# Patient Record
Sex: MALE | Race: WHITE | NOT HISPANIC OR LATINO | Employment: UNEMPLOYED | URBAN - METROPOLITAN AREA
[De-identification: names, ages, dates, MRNs, and addresses within clinical notes are randomized per-mention and may not be internally consistent; named-entity substitution may affect disease eponyms.]

---

## 2017-01-04 DIAGNOSIS — S06.9X9S: ICD-10-CM

## 2017-01-04 DIAGNOSIS — R53.83 LETHARGY: Primary | ICD-10-CM

## 2017-01-04 DIAGNOSIS — R29.6 FALLS FREQUENTLY: ICD-10-CM

## 2017-01-04 DIAGNOSIS — R47.89 SLOW RATE OF SPEECH: ICD-10-CM

## 2017-01-04 DIAGNOSIS — K11.7 DROOLING: ICD-10-CM

## 2017-01-10 ENCOUNTER — HOSPITAL ENCOUNTER (OUTPATIENT)
Dept: MRI IMAGING | Facility: HOSPITAL | Age: 42
Discharge: HOME | End: 2017-01-10
Admitting: NURSE PRACTITIONER

## 2017-01-10 DIAGNOSIS — R29.6 FALLS FREQUENTLY: ICD-10-CM

## 2017-01-10 DIAGNOSIS — K11.7 DROOLING: ICD-10-CM

## 2017-01-10 DIAGNOSIS — S06.9X9S: ICD-10-CM

## 2017-01-10 DIAGNOSIS — R53.83 LETHARGY: ICD-10-CM

## 2017-01-10 DIAGNOSIS — B35.2 TINEA MANUUM: Primary | ICD-10-CM

## 2017-01-10 DIAGNOSIS — R47.89 SLOW RATE OF SPEECH: ICD-10-CM

## 2017-01-10 PROCEDURE — 70551 MRI BRAIN STEM W/O DYE: CPT

## 2017-01-12 ENCOUNTER — OFFICE VISIT (OUTPATIENT)
Dept: FAMILY MEDICINE CLINIC | Facility: CLINIC | Age: 42
End: 2017-01-12

## 2017-01-12 VITALS
HEIGHT: 70 IN | HEART RATE: 84 BPM | TEMPERATURE: 98.3 F | DIASTOLIC BLOOD PRESSURE: 90 MMHG | SYSTOLIC BLOOD PRESSURE: 120 MMHG

## 2017-01-12 DIAGNOSIS — R47.89 SLOW RATE OF SPEECH: ICD-10-CM

## 2017-01-12 DIAGNOSIS — S06.9X9S: ICD-10-CM

## 2017-01-12 DIAGNOSIS — K11.7 DROOLING: ICD-10-CM

## 2017-01-12 DIAGNOSIS — R53.83 LETHARGY: ICD-10-CM

## 2017-01-12 DIAGNOSIS — R26.89 IMBALANCE: Primary | ICD-10-CM

## 2017-01-12 PROCEDURE — 99213 OFFICE O/P EST LOW 20 MIN: CPT | Performed by: FAMILY MEDICINE

## 2017-01-12 NOTE — MR AVS SNAPSHOT
Reji Chen   1/12/2017 9:15 AM   Office Visit    Dept Phone:  [phone]   Encounter #:  [id number]    Provider:  Winter Robertson DO   Department:  [facility]                Your Full Care Plan              Your Updated Medication List          This list is accurate as of: 1/12/17  9:43 AM.  Always use your most recent med list.                ammonium lactate 12 % lotion   Commonly known as:  LAC-HYDRIN       atorvastatin 40 MG tablet   Commonly known as:  LIPITOR       baclofen 20 MG tablet   Commonly known as:  LIORESAL       FLUoxetine 20 MG capsule   Commonly known as:  PROzac       Ibuprofen 200 MG capsule       ketoconazole 2 % cream   Commonly known as:  NIZORAL   Apply to affected area between fingers once daily for 4 weeks       LOVAZA 1 G capsule   Generic drug:  omega-3 acid ethyl esters       OLANZapine 15 MG tablet   Commonly known as:  zyPREXA       PROAIR  (90 BASE) MCG/ACT inhaler   Generic drug:  albuterol       tiZANidine 4 MG tablet   Commonly known as:  ZANAFLEX       Vitamin D 2000 UNITS capsule               We Performed the Following     Ambulatory Referral to Neurology       You Were Diagnosed With        Codes Comments    Drooling    -  Primary ICD-10-CM: K11.7  ICD-9-CM: 527.7     Lethargy     ICD-10-CM: R53.83  ICD-9-CM: 780.79     Slow rate of speech     ICD-10-CM: R47.89  ICD-9-CM: 784.59     Traumatic brain injury with loss of consciousness, unspecified duration, sequela     ICD-10-CM: S06.9X9S  ICD-9-CM: 907.0     Imbalance     ICD-10-CM: R26.89  ICD-9-CM: 781.2       Instructions    Go to the nearest ER or return to clinic if symptoms worsen, fever/chill develop      Fall Prevention in the Home   Falls can cause injuries. They can happen to people of all ages. There are many things you can do to make your home safe and to help prevent falls.   WHAT CAN I DO ON THE OUTSIDE OF MY HOME?  · Regularly fix the  edges of walkways and driveways and fix any cracks.  · Remove anything that might make you trip as you walk through a door, such as a raised step or threshold.  · Trim any bushes or trees on the path to your home.  · Use bright outdoor lighting.  · Clear any walking paths of anything that might make someone trip, such as rocks or tools.  · Regularly check to see if handrails are loose or broken. Make sure that both sides of any steps have handrails.  · Any raised decks and porches should have guardrails on the edges.  · Have any leaves, snow, or ice cleared regularly.  · Use sand or salt on walking paths during winter.  · Clean up any spills in your garage right away. This includes oil or grease spills.  WHAT CAN I DO IN THE BATHROOM?   · Use night lights.  · Install grab bars by the toilet and in the tub and shower. Do not use towel bars as grab bars.  · Use non-skid mats or decals in the tub or shower.  · If you need to sit down in the shower, use a plastic, non-slip stool.  · Keep the floor dry. Clean up any water that spills on the floor as soon as it happens.  · Remove soap buildup in the tub or shower regularly.  · Attach bath mats securely with double-sided non-slip rug tape.  · Do not have throw rugs and other things on the floor that can make you trip.  WHAT CAN I DO IN THE BEDROOM?  · Use night lights.  · Make sure that you have a light by your bed that is easy to reach.  · Do not use any sheets or blankets that are too big for your bed. They should not hang down onto the floor.  · Have a firm chair that has side arms. You can use this for support while you get dressed.  · Do not have throw rugs and other things on the floor that can make you trip.  WHAT CAN I DO IN THE KITCHEN?  · Clean up any spills right away.  · Avoid walking on wet floors.  · Keep items that you use a lot in easy-to-reach places.  · If you need to reach something above you, use a strong step stool that has a grab bar.  · Keep  electrical cords out of the way.  · Do not use floor polish or wax that makes floors slippery. If you must use wax, use non-skid floor wax.  · Do not have throw rugs and other things on the floor that can make you trip.  WHAT CAN I DO WITH MY STAIRS?  · Do not leave any items on the stairs.  · Make sure that there are handrails on both sides of the stairs and use them. Fix handrails that are broken or loose. Make sure that handrails are as long as the stairways.  · Check any carpeting to make sure that it is firmly attached to the stairs. Fix any carpet that is loose or worn.  · Avoid having throw rugs at the top or bottom of the stairs. If you do have throw rugs, attach them to the floor with carpet tape.  · Make sure that you have a light switch at the top of the stairs and the bottom of the stairs. If you do not have them, ask someone to add them for you.  WHAT ELSE CAN I DO TO HELP PREVENT FALLS?  · Wear shoes that:    Do not have high heels.    Have rubber bottoms.    Are comfortable and fit you well.    Are closed at the toe. Do not wear sandals.  · If you use a stepladder:    Make sure that it is fully opened. Do not climb a closed stepladder.    Make sure that both sides of the stepladder are locked into place.    Ask someone to hold it for you, if possible.  · Clearly michelle and make sure that you can see:    Any grab bars or handrails.    First and last steps.    Where the edge of each step is.  · Use tools that help you move around (mobility aids) if they are needed. These include:    Canes.    Walkers.    Scooters.    Crutches.  · Turn on the lights when you go into a dark area. Replace any light bulbs as soon as they burn out.  · Set up your furniture so you have a clear path. Avoid moving your furniture around.  · If any of your floors are uneven, fix them.  · If there are any pets around you, be aware of where they are.  · Review your medicines with your doctor. Some medicines can make you feel dizzy.  This can increase your chance of falling.  Ask your doctor what other things that you can do to help prevent falls.     This information is not intended to replace advice given to you by your health care provider. Make sure you discuss any questions you have with your health care provider.     Document Released: 10/14/2010 Document Revised: 2016 Document Reviewed: 2016  [employer] Interactive Patient Education © [employer]       Patient Instructions History      Upcoming Appointments     Visit Type Date Time Department    OFFICE VISIT 2017  9:15 AM [facility]    FOLLOW UP 3/9/2017  8:45 AM [facility]      [employer] Signup     [facility] wildcraft allows you to send messages to your doctor, view your test results, renew your prescriptions, schedule appointments, and more. To sign up, go to [employer] and click on the Sign Up Now link in the New User? box. Enter your wildcraft Activation Code exactly as it appears below along with the last four digits of your Social Security Number and your Date of Birth () to complete the sign-up process. If you do not sign up before the expiration date, you must request a new code.    wildcraft Activation Code: [email or web address]  Expires: 2017  9:40 AM    If you have questions, you can email [email or web address] or call [phone] to talk to our wildcraft staff. Remember, wildcraft is NOT to be used for urgent needs. For medical emergencies, dial 911.               Other Info from Your Visit           Your Appointments     Mar 09, 2017  8:45 AM EST   Follow Up with Winter Robertson DO   [facility] (--)    [address] [place]  [place] [address] [phone]           Arrive 15 minutes prior to appointment. Arrive 15 minutes before your appointment to complete Registration. Please bring all medications, insurance card and copay.              Allergies     Dantrolene       "  Reason for Visit     FU on frequent falls           Vital Signs     Blood Pressure Pulse Temperature Height Smoking Status       120/90 84 98.3 °F (36.8 °C) 70\" (177.8 cm) Current Every Day Smoker       Problems and Diagnoses Noted     Drooling    -  Primary    Lethargy        Slow rate of speech        Brain injury        Imbalance            "

## 2017-01-12 NOTE — PATIENT INSTRUCTIONS
Go to the nearest ER or return to clinic if symptoms worsen, fever/chill develop      Fall Prevention in the Home   Falls can cause injuries. They can happen to people of all ages. There are many things you can do to make your home safe and to help prevent falls.   WHAT CAN I DO ON THE OUTSIDE OF MY HOME?  · Regularly fix the edges of walkways and driveways and fix any cracks.  · Remove anything that might make you trip as you walk through a door, such as a raised step or threshold.  · Trim any bushes or trees on the path to your home.  · Use bright outdoor lighting.  · Clear any walking paths of anything that might make someone trip, such as rocks or tools.  · Regularly check to see if handrails are loose or broken. Make sure that both sides of any steps have handrails.  · Any raised decks and porches should have guardrails on the edges.  · Have any leaves, snow, or ice cleared regularly.  · Use sand or salt on walking paths during winter.  · Clean up any spills in your garage right away. This includes oil or grease spills.  WHAT CAN I DO IN THE BATHROOM?   · Use night lights.  · Install grab bars by the toilet and in the tub and shower. Do not use towel bars as grab bars.  · Use non-skid mats or decals in the tub or shower.  · If you need to sit down in the shower, use a plastic, non-slip stool.  · Keep the floor dry. Clean up any water that spills on the floor as soon as it happens.  · Remove soap buildup in the tub or shower regularly.  · Attach bath mats securely with double-sided non-slip rug tape.  · Do not have throw rugs and other things on the floor that can make you trip.  WHAT CAN I DO IN THE BEDROOM?  · Use night lights.  · Make sure that you have a light by your bed that is easy to reach.  · Do not use any sheets or blankets that are too big for your bed. They should not hang down onto the floor.  · Have a firm chair that has side arms. You can use this for support while you get dressed.  · Do not have  throw rugs and other things on the floor that can make you trip.  WHAT CAN I DO IN THE KITCHEN?  · Clean up any spills right away.  · Avoid walking on wet floors.  · Keep items that you use a lot in easy-to-reach places.  · If you need to reach something above you, use a strong step stool that has a grab bar.  · Keep electrical cords out of the way.  · Do not use floor polish or wax that makes floors slippery. If you must use wax, use non-skid floor wax.  · Do not have throw rugs and other things on the floor that can make you trip.  WHAT CAN I DO WITH MY STAIRS?  · Do not leave any items on the stairs.  · Make sure that there are handrails on both sides of the stairs and use them. Fix handrails that are broken or loose. Make sure that handrails are as long as the stairways.  · Check any carpeting to make sure that it is firmly attached to the stairs. Fix any carpet that is loose or worn.  · Avoid having throw rugs at the top or bottom of the stairs. If you do have throw rugs, attach them to the floor with carpet tape.  · Make sure that you have a light switch at the top of the stairs and the bottom of the stairs. If you do not have them, ask someone to add them for you.  WHAT ELSE CAN I DO TO HELP PREVENT FALLS?  · Wear shoes that:    Do not have high heels.    Have rubber bottoms.    Are comfortable and fit you well.    Are closed at the toe. Do not wear sandals.  · If you use a stepladder:    Make sure that it is fully opened. Do not climb a closed stepladder.    Make sure that both sides of the stepladder are locked into place.    Ask someone to hold it for you, if possible.  · Clearly michelle and make sure that you can see:    Any grab bars or handrails.    First and last steps.    Where the edge of each step is.  · Use tools that help you move around (mobility aids) if they are needed. These include:    Canes.    Walkers.    Scooters.    Crutches.  · Turn on the lights when you go into a dark area. Replace any  light bulbs as soon as they burn out.  · Set up your furniture so you have a clear path. Avoid moving your furniture around.  · If any of your floors are uneven, fix them.  · If there are any pets around you, be aware of where they are.  · Review your medicines with your doctor. Some medicines can make you feel dizzy. This can increase your chance of falling.  Ask your doctor what other things that you can do to help prevent falls.     This information is not intended to replace advice given to you by your health care provider. Make sure you discuss any questions you have with your health care provider.     Document Released: 10/14/2010 Document Revised: 05/03/2016 Document Reviewed: 01/22/2016  Elsevier Interactive Patient Education ©2016 [employer]

## 2017-01-12 NOTE — PROGRESS NOTES
[facility] DANNY Chen is a 41 y.o. male.     History of Present Illness   Here for follow up of frequent falls, lethargy, drooling  Recently had MRI of brain completed, no acute findings, only posttraumatic changes and chronic sinusitis (he is asymptomatic)  Barium swallow study is scheduled for next week.  Labs were completed Dec 2016, nothing remarkable to explain condition. CXR completed then, atelectatic changes seen. No respiratory symptoms.   Teresa Tam is accompanying him today. Patient is a [facility] client. She states that his condition hasn't changed any. She thinks that he is still talking slow, still lethargic and drooling.   They currently have him under fall precautions, they have had to catch him multiple times to prevent falls. He hasn't had an actual fall since his last visit with me.   The patient states that he feels fine and doesn't feel tired. He is capable of answering my questions, not altered  Pt is seeing Dr. Timo Kim PM&R, recently decreased Baclofen from 20mg TID to 10mg TID to see if that would improve his condition. They have not noticed any improvement yet.   He currently isn't established with neurology.     The following portions of the patient's history were reviewed and updated as appropriate: allergies, current medications, past family history, past medical history, past social history, past surgical history and problem list.    Review of Systems   Constitutional: Positive for fatigue. Negative for chills and fever.   HENT:        Drooling   Respiratory: Negative for cough and shortness of breath.    Cardiovascular: Negative for chest pain.   Gastrointestinal: Negative.    Musculoskeletal: Positive for gait problem (ambulates with walker, imbalance).   Skin: Negative for wound.   Neurological: Positive for speech difficulty (secondary to TBI). Negative for dizziness, syncope, light-headedness and headaches.   Psychiatric/Behavioral: Negative for agitation.        Objective   Physical Exam   Constitutional: He is oriented to person, place, and time. He appears well-developed and well-nourished.   HENT:   Head: Normocephalic and atraumatic.   Right Ear: External ear normal.   Left Ear: External ear normal.   Nose: Nose normal.   Eyes: Conjunctivae and EOM are normal.   Neck: Normal range of motion. Neck supple.   Cardiovascular: Normal rate, regular rhythm and normal heart sounds.    No murmur heard.  Pulmonary/Chest: Effort normal and breath sounds normal. No respiratory distress. He has no wheezes.   Musculoskeletal: He exhibits no edema.   Neurological: He is alert and oriented to person, place, and time. No cranial nerve deficit.   Skin: Skin is warm and dry. No rash noted.   Psychiatric: He has a normal mood and affect. His behavior is normal. Judgment and thought content normal.   Slow speech   Nursing note and vitals reviewed.      Assessment/Plan   Mendoza was seen today for fu on frequent falls.    Diagnoses and all orders for this visit:    Imbalance    Lethargy  -     Ambulatory Referral to Neurology    Drooling  -     Ambulatory Referral to Neurology    Slow rate of speech  -     Ambulatory Referral to Neurology    Traumatic brain injury with loss of consciousness, unspecified duration, sequela  -     Ambulatory Referral to Neurology    Due to ongoing symptoms without improvement, will refer to neurology for further evaluation  Continue fall precautions  Complete the barium swallow study due to drooling  Go to the nearest ER or return to clinic if symptoms worsen, fever/chill develop    Winter Robertson,

## 2017-02-01 ENCOUNTER — TELEPHONE (OUTPATIENT)
Dept: FAMILY MEDICINE CLINIC | Facility: CLINIC | Age: 42
End: 2017-02-01

## 2017-02-28 ENCOUNTER — TELEPHONE (OUTPATIENT)
Dept: NEUROLOGY | Facility: CLINIC | Age: 42
End: 2017-02-28

## 2017-02-28 NOTE — TELEPHONE ENCOUNTER
----- Message from Chapis Pack MD sent at 2/28/2017  9:25 AM EST -----  Regarding: RE: FALLON- REFERRAL  Yes, sorry to delay on these. This referral is OK.  ----- Message -----     From: Kelley Brito     Sent: 2/28/2017   8:21 AM       To: Chapis Pack MD  Subject: FALLON- REFERRAL                              I SENT YOU A REFERRAL FOR THIS PT TO LOOK OVER. THE PATIENT'S MOTHER WANTS THE PATIENT TO SEE YOU AND ONLY YOU BASICALLY. CAN YOU PLEASE LOOK OVER THAT REFERRAL AND LET ME KNOW WHAT YOU THINK? I HAVE EXPLAINED TO THE NURSE AT THE HOME HE LIVES IN HOW YOUR REFERRAL PROCESS GOES.     ALL OF HIS INFO SHOULD BE IN YOUR INBASKET    THANK YOU SO MUCH,  KELLEY

## 2017-05-17 ENCOUNTER — OFFICE VISIT (OUTPATIENT)
Dept: NEUROLOGY | Facility: CLINIC | Age: 42
End: 2017-05-17

## 2017-05-17 ENCOUNTER — LAB (OUTPATIENT)
Dept: LAB | Facility: HOSPITAL | Age: 42
End: 2017-05-17

## 2017-05-17 VITALS
HEART RATE: 76 BPM | SYSTOLIC BLOOD PRESSURE: 118 MMHG | DIASTOLIC BLOOD PRESSURE: 84 MMHG | WEIGHT: 198 LBS | BODY MASS INDEX: 28.41 KG/M2

## 2017-05-17 DIAGNOSIS — F05: Primary | ICD-10-CM

## 2017-05-17 DIAGNOSIS — F05: ICD-10-CM

## 2017-05-17 LAB
AMMONIA BLD-SCNC: 18 UMOL/L (ref 19–60)
FOLATE SERPL-MCNC: 9.76 NG/ML (ref 3.2–20)
VIT B12 BLD-MCNC: 285 PG/ML (ref 211–911)

## 2017-05-17 PROCEDURE — 99205 OFFICE O/P NEW HI 60 MIN: CPT | Performed by: PSYCHIATRY & NEUROLOGY

## 2017-05-17 PROCEDURE — 82140 ASSAY OF AMMONIA: CPT | Performed by: PSYCHIATRY & NEUROLOGY

## 2017-05-17 PROCEDURE — 36415 COLL VENOUS BLD VENIPUNCTURE: CPT

## 2017-05-17 PROCEDURE — 82746 ASSAY OF FOLIC ACID SERUM: CPT | Performed by: PSYCHIATRY & NEUROLOGY

## 2017-05-17 PROCEDURE — 82607 VITAMIN B-12: CPT | Performed by: PSYCHIATRY & NEUROLOGY

## 2017-05-17 RX ORDER — POLYETHYLENE GLYCOL 3350 17 G/17G
17 POWDER, FOR SOLUTION ORAL DAILY
COMMUNITY

## 2017-05-17 RX ORDER — DOCUSATE SODIUM 100 MG/1
100 CAPSULE, LIQUID FILLED ORAL DAILY
COMMUNITY

## 2017-05-17 RX ORDER — IBUPROFEN 200 MG
200 TABLET ORAL EVERY 6 HOURS PRN
COMMUNITY

## 2017-05-17 RX ORDER — CALCIUM CARBONATE 200(500)MG
1 TABLET,CHEWABLE ORAL
COMMUNITY

## 2017-05-17 RX ORDER — OMEGA-3-ACID ETHYL ESTERS 1 G/1
2 CAPSULE, LIQUID FILLED ORAL 2 TIMES DAILY
COMMUNITY

## 2017-05-17 RX ORDER — PAROXETINE HYDROCHLORIDE 20 MG/1
20 TABLET, FILM COATED ORAL EVERY MORNING
COMMUNITY

## 2017-06-29 ENCOUNTER — HOSPITAL ENCOUNTER (OUTPATIENT)
Dept: NEUROLOGY | Facility: HOSPITAL | Age: 42
Discharge: HOME | End: 2017-06-29
Attending: PSYCHIATRY & NEUROLOGY | Admitting: PSYCHIATRY & NEUROLOGY

## 2017-06-29 DIAGNOSIS — F05: ICD-10-CM

## 2017-06-29 PROCEDURE — 95816 EEG AWAKE AND DROWSY: CPT

## 2017-07-12 ENCOUNTER — OFFICE VISIT (OUTPATIENT)
Dept: NEUROLOGY | Facility: CLINIC | Age: 42
End: 2017-07-12

## 2017-07-12 VITALS
SYSTOLIC BLOOD PRESSURE: 130 MMHG | HEIGHT: 70 IN | WEIGHT: 198 LBS | BODY MASS INDEX: 28.35 KG/M2 | DIASTOLIC BLOOD PRESSURE: 70 MMHG

## 2017-07-12 DIAGNOSIS — R41.89 COGNITIVE DECLINE: ICD-10-CM

## 2017-07-12 DIAGNOSIS — E53.8 B12 DEFICIENCY: ICD-10-CM

## 2017-07-12 DIAGNOSIS — S06.9X6S TRAUMATIC BRAIN INJURY, WITH LOSS OF CONSCIOUSNESS GREATER THAN 24 HOURS WITHOUT RETURN TO PRE-EXISTING CONSCIOUS LEVEL WITH PATIENT SURVIVING, SEQUELA (HCC): Primary | ICD-10-CM

## 2017-07-12 PROCEDURE — 99214 OFFICE O/P EST MOD 30 MIN: CPT | Performed by: PSYCHIATRY & NEUROLOGY

## 2017-07-12 RX ORDER — LANOLIN ALCOHOL/MO/W.PET/CERES
1000 CREAM (GRAM) TOPICAL DAILY
Qty: 30 TABLET | Refills: 11 | Status: SHIPPED | OUTPATIENT
Start: 2017-07-12 | End: 2017-07-12 | Stop reason: SDUPTHER

## 2017-07-12 RX ORDER — LANOLIN ALCOHOL/MO/W.PET/CERES
1000 CREAM (GRAM) TOPICAL DAILY
Qty: 30 TABLET | Refills: 11 | Status: SHIPPED | OUTPATIENT
Start: 2017-07-12

## 2017-07-12 NOTE — PROGRESS NOTES
"Subjective   Mendoza DANNY Chen is a 41 y.o. male.     History of Present Illness   Pt originally seen 5/17 for increased falls, occasional confusion, drooling. Hx significant for TBI, right HP after falling off [facility]uardo at [facility] in 1994. Recent PCP visits (6mo or so) for slurred speech, worsened balance and falls, had lab testing and MRI. 9/14 Dr Keane noted lethargy, slowed speech, poor balance for the past few weeks.  Couple of weeks prior started on Risperdal/stopped Depakote. Did not improve the inappropriate behaviors - so the Depakote was added back. Risperdal then stopped because of balance issues, speech issues; speech therapist felt his thought processes slowed. Then improved with med adjustment (details unclear).      Stable until 12/16. In January tizanidine, baclofen and zyprexa discontinued, somewhat better since about March, now stable but impaired. Caregiver feels there was some improvement with discontinuing these. Short term memory severely impaired at baseline.   Went for PT for a month, discharged due to lack of improvement.   Recent MBS showed aspiration of thin liquids. Occasional headache relieved by ibuprofen. Dresses himself, usually walks on own, but very unsteady, so using WC recently. No change in b/b control.   Has bad days and good, may go a couple days where more confused, slower speech, worsened memory, more repetitive questions. Also more verbal outbursts.   Got Botox in arm in April, helped tightness in arm. Has prisms in glasses for diplopia.  Has h/o of seizures years ago, no recent events witnessed that have been suggestive of seizures.     MRI 5/14/17: old left BG and posttraumatic changes right anteromedial temporal and superomedial right frontal. Also moderate global atrophy for age. TSH, CMP, CBC OK. Repeated in May, reviewed and OK.   Today reports doing well. Pt reports it's \"always been a nithya road.\" Per caregiver, occasional outbursts, but no change in cognition, or " loss of abilities that she can see. He has had falls, but typically only when reaching for things. Using walker. Pt feels mood OK, not eager for more medication.   B12, folate, ammonia, all normal although B12 low normal at 285. EEG 6/17 showed mild intermittent generalized slowing, no epileptiform discharges.    The following portions of the patient's history were reviewed and updated as appropriate: allergies, current medications, past family history, past medical history, past social history, past surgical history and problem list.    Review of Systems   Constitutional: Negative for fever and unexpected weight change.   Respiratory: Negative for cough and shortness of breath.    Cardiovascular: Negative for chest pain.   Musculoskeletal: Positive for gait problem.   Neurological: Negative for seizures.       Objective   Physical Exam   Constitutional: He appears well-nourished.   HENT:   Head: Normocephalic and atraumatic.   Eyes: EOM are normal. Pupils are equal, round, and reactive to light.   Pulmonary/Chest: Effort normal.   Musculoskeletal: Normal range of motion.   Neurological: He has a normal Finger-Nose-Finger Test and a normal Heel to Shin Test. Gait normal.   Skin: Skin is warm and dry.   Nursing note and vitals reviewed.      Neurologic Exam     Mental Status   Speech: (Slow, mild dysarthria)  Level of consciousness: alert  Knowledge: inconsistent with education.   Able to name object. Abnormal comprehension (mild difficulty following simple commands).     Cranial Nerves     CN II   Visual fields full to confrontation.     CN III, IV, VI   Pupils are equal, round, and reactive to light.  Extraocular motions are normal.     CN VII   Facial expression full, symmetric.     CN IX, X   CN IX normal.   CN X normal.   Palate: symmetric    CN XI   CN XI normal.     CN XII   CN XII normal.     Motor Exam   Muscle bulk: normal  Right arm pronator drift: absent  Left arm pronator drift: absent    Strength    Strength 5/5 except as noted.     Sensory Exam   Light touch normal.     Gait, Coordination, and Reflexes     Gait  Gait: normal    Coordination   Finger to nose coordination: normal  Heel to shin coordination: normal    Tremor   Resting tremor: absent  Intention tremor: absent  Action tremor: absent      Assessment/Plan   Mendoza was seen today for fall.    Diagnoses and all orders for this visit:    Traumatic brain injury, with loss of consciousness greater than 24 hours without return to pre-existing conscious level with patient surviving, sequela    Cognitive decline    B12 deficiency    Other orders  -     Discontinue: vitamin B-12 (CYANOCOBALAMIN) 1000 MCG tablet; Take 1 tablet by mouth Daily.  -     vitamin B-12 (CYANOCOBALAMIN) 1000 MCG tablet; Take 1 tablet by mouth Daily.        Discussion/Summary:  At this point, he appears to be cognitively stable, albeit with fairly recent decline from previous function by hx. B12 is borderline low, will supplement. Would consider empiric trial of AED if begins having episodic sx again, or even more continuous sx if declining. Could also consider acetylcholinesterase inhibitor depending on course.   25 minutes face to face, 15 minutes spent discussing test results and tx options.  Return in about 6 months (around 1/12/2018).

## 2024-07-12 ENCOUNTER — LAB (OUTPATIENT)
Dept: LAB | Facility: HOSPITAL | Age: 49
End: 2024-07-12
Payer: MEDICAID

## 2024-07-12 ENCOUNTER — TRANSCRIBE ORDERS (OUTPATIENT)
Dept: LAB | Facility: HOSPITAL | Age: 49
End: 2024-07-12
Payer: MEDICAID

## 2024-07-12 DIAGNOSIS — F33.1 MAJOR DEPRESSIVE DISORDER, RECURRENT EPISODE, MODERATE: Primary | ICD-10-CM

## 2024-07-12 DIAGNOSIS — F33.1 MAJOR DEPRESSIVE DISORDER, RECURRENT EPISODE, MODERATE: ICD-10-CM

## 2024-07-12 LAB
ALBUMIN SERPL-MCNC: 4.4 G/DL (ref 3.5–5.2)
ALBUMIN/GLOB SERPL: 1.8 G/DL
ALP SERPL-CCNC: 75 U/L (ref 39–117)
ALT SERPL W P-5'-P-CCNC: 17 U/L (ref 1–41)
ANION GAP SERPL CALCULATED.3IONS-SCNC: 9.2 MMOL/L (ref 5–15)
AST SERPL-CCNC: 19 U/L (ref 1–40)
BASOPHILS # BLD AUTO: 0.07 10*3/MM3 (ref 0–0.2)
BASOPHILS NFR BLD AUTO: 0.9 % (ref 0–1.5)
BILIRUB SERPL-MCNC: 0.9 MG/DL (ref 0–1.2)
BUN SERPL-MCNC: 12 MG/DL (ref 6–20)
BUN/CREAT SERPL: 12.8 (ref 7–25)
CALCIUM SPEC-SCNC: 10.7 MG/DL (ref 8.6–10.5)
CHLORIDE SERPL-SCNC: 107 MMOL/L (ref 98–107)
CO2 SERPL-SCNC: 21.8 MMOL/L (ref 22–29)
CREAT SERPL-MCNC: 0.94 MG/DL (ref 0.76–1.27)
DEPRECATED RDW RBC AUTO: 39.6 FL (ref 37–54)
EGFRCR SERPLBLD CKD-EPI 2021: 100 ML/MIN/1.73
EOSINOPHIL # BLD AUTO: 0.13 10*3/MM3 (ref 0–0.4)
EOSINOPHIL NFR BLD AUTO: 1.7 % (ref 0.3–6.2)
ERYTHROCYTE [DISTWIDTH] IN BLOOD BY AUTOMATED COUNT: 12.2 % (ref 12.3–15.4)
GLOBULIN UR ELPH-MCNC: 2.4 GM/DL
GLUCOSE SERPL-MCNC: 116 MG/DL (ref 65–99)
HCT VFR BLD AUTO: 47.7 % (ref 37.5–51)
HGB BLD-MCNC: 16.4 G/DL (ref 13–17.7)
IMM GRANULOCYTES # BLD AUTO: 0.01 10*3/MM3 (ref 0–0.05)
IMM GRANULOCYTES NFR BLD AUTO: 0.1 % (ref 0–0.5)
LYMPHOCYTES # BLD AUTO: 2.66 10*3/MM3 (ref 0.7–3.1)
LYMPHOCYTES NFR BLD AUTO: 34 % (ref 19.6–45.3)
MCH RBC QN AUTO: 30.5 PG (ref 26.6–33)
MCHC RBC AUTO-ENTMCNC: 34.4 G/DL (ref 31.5–35.7)
MCV RBC AUTO: 88.8 FL (ref 79–97)
MONOCYTES # BLD AUTO: 0.62 10*3/MM3 (ref 0.1–0.9)
MONOCYTES NFR BLD AUTO: 7.9 % (ref 5–12)
NEUTROPHILS NFR BLD AUTO: 4.33 10*3/MM3 (ref 1.7–7)
NEUTROPHILS NFR BLD AUTO: 55.4 % (ref 42.7–76)
NRBC BLD AUTO-RTO: 0 /100 WBC (ref 0–0.2)
PLATELET # BLD AUTO: 259 10*3/MM3 (ref 140–450)
PMV BLD AUTO: 9.5 FL (ref 6–12)
POTASSIUM SERPL-SCNC: 4.2 MMOL/L (ref 3.5–5.2)
PROT SERPL-MCNC: 6.8 G/DL (ref 6–8.5)
RBC # BLD AUTO: 5.37 10*6/MM3 (ref 4.14–5.8)
SODIUM SERPL-SCNC: 138 MMOL/L (ref 136–145)
T3 SERPL-MCNC: 105 NG/DL (ref 80–200)
T4 FREE SERPL-MCNC: 1.14 NG/DL (ref 0.92–1.68)
TSH SERPL DL<=0.05 MIU/L-ACNC: 1.42 UIU/ML (ref 0.27–4.2)
WBC NRBC COR # BLD AUTO: 7.82 10*3/MM3 (ref 3.4–10.8)

## 2024-07-12 PROCEDURE — 84480 ASSAY TRIIODOTHYRONINE (T3): CPT

## 2024-07-12 PROCEDURE — 85025 COMPLETE CBC W/AUTO DIFF WBC: CPT

## 2024-07-12 PROCEDURE — 84439 ASSAY OF FREE THYROXINE: CPT

## 2024-07-12 PROCEDURE — 80053 COMPREHEN METABOLIC PANEL: CPT | Performed by: PHYSICAL MEDICINE & REHABILITATION

## 2024-07-12 PROCEDURE — 36415 COLL VENOUS BLD VENIPUNCTURE: CPT | Performed by: PHYSICAL MEDICINE & REHABILITATION

## 2024-07-12 PROCEDURE — 84443 ASSAY THYROID STIM HORMONE: CPT
